# Patient Record
Sex: MALE | Race: BLACK OR AFRICAN AMERICAN | NOT HISPANIC OR LATINO | Employment: OTHER | URBAN - METROPOLITAN AREA
[De-identification: names, ages, dates, MRNs, and addresses within clinical notes are randomized per-mention and may not be internally consistent; named-entity substitution may affect disease eponyms.]

---

## 2017-01-03 NOTE — TELEPHONE ENCOUNTER
----- Message from Anastasia Barry sent at 1/3/2017  2:29 PM EST -----  Contact: Leila Hernandez, wife [phone]  Needs rx for Calcium acetate 90 day supply sent to [facility] mail order.  Wife, Leila, can be reached at [phone].  States he is completely out and needs this done ASAP.

## 2017-01-04 RX ORDER — CALCIUM ACETATE 667 MG/1
CAPSULE ORAL
Qty: 270 CAPSULE | Refills: 2 | Status: SHIPPED | OUTPATIENT
Start: 2017-01-04

## 2017-01-09 ENCOUNTER — TELEPHONE (OUTPATIENT)
Dept: INTERNAL MEDICINE | Facility: CLINIC | Age: 67
End: 2017-01-09

## 2017-01-09 NOTE — TELEPHONE ENCOUNTER
----- Message from Anastasia Barry sent at 1/9/2017 10:17 AM EST -----  Contact: Leila, wife [phone]  Leila Hernandez called stating patient is in hospital at [facility] for UTI as it was making him out of his head.  She just wanted to make sure Dr. Mullen gets those records.  She can be reached at [phone] if needed.

## 2017-02-23 ENCOUNTER — TELEPHONE (OUTPATIENT)
Dept: INTERNAL MEDICINE | Facility: CLINIC | Age: 67
End: 2017-02-23